# Patient Record
(demographics unavailable — no encounter records)

---

## 2025-05-06 NOTE — HISTORY OF PRESENT ILLNESS
[Has the patient missed work because of the injury/illness?] : The patient has missed work because of the injury/illness. [No] : The patient is currently not working. [FreeTextEntry1] : Date of injury:4/28/2025 (8 days ago)  25 yr old male patient presents today for evaluation of laceration of the left hand sustained at work. He was treated at [facility] where he had 8 stitches and wrapped with a bandage. He was recommended to follow up with an orthopedist to get the stitches removed. No previous images. Patient is currently out of work [FreeTextEntry2] :  [FreeTextEntry6] : 4/28/2025

## 2025-05-06 NOTE — PHYSICAL EXAM
[de-identified] : Physical exam demonstrates the patient to be alert and oriented x 3 and capable of ambulation. The patient is well-developed and well-nourished in no apparent respiratory distress. The majority of the skin is intact bilaterally in the upper extremities without any bilateral elbow lymphadenopathy.  2 separate 1 to 1.5 cm transverse lacerations over the ring and long finger; nylon sutures are in place.  No wound healing issues or signs of infection.  The patient exhibits near full, active left hand range of motion, including at the ring and long finger, where there is mildly diminished terminal active digital flexion due to stiffness and apprehension.  The patient is able to actively extend the left long and ring finger at the MCP joint from a tabletop position. [de-identified] : PA, oblique and lateral x-rays of left hand were obtained today to assess for bony injury. No appreciation of acute fracture, dislocation or deformity. No significant degenerative changes.

## 2025-05-06 NOTE — ASSESSMENT
[FreeTextEntry1] : I discussed the nature of the patients left dorsal hand laceration injury, explaining no appreciation of long or ring finger extensor tendon disruption on exam today.  I discussed the treatment plan moving forward, which would entail diligent work with OT and home exercise to rehab the hand to get the fingers moving.  I explained the challenging nature of posttraumatic stiffness.  Nylon sutures were removed today and Steri-Strips were placed.  I advised he not get the hand wet for another week to allow the incisions to completely heal before they can be run under water.  The patient works as a  and will plan to be out of work for the next 3 weeks, until he follows back up with me in the office for reevaluation of his left hand.  A prescription for OT was given.  All questions were answered.